# Patient Record
Sex: FEMALE | Race: WHITE | NOT HISPANIC OR LATINO | Employment: UNEMPLOYED | URBAN - NONMETROPOLITAN AREA
[De-identification: names, ages, dates, MRNs, and addresses within clinical notes are randomized per-mention and may not be internally consistent; named-entity substitution may affect disease eponyms.]

---

## 2024-11-08 ENCOUNTER — APPOINTMENT (OUTPATIENT)
Dept: PRIMARY CARE | Facility: CLINIC | Age: 64
End: 2024-11-08
Payer: COMMERCIAL

## 2024-12-04 ASSESSMENT — PROMIS GLOBAL HEALTH SCALE
RATE_MENTAL_HEALTH: VERY GOOD
RATE_PHYSICAL_HEALTH: VERY GOOD
CARRYOUT_SOCIAL_ACTIVITIES: VERY GOOD
RATE_GENERAL_HEALTH: VERY GOOD
RATE_AVERAGE_PAIN: 0
CARRYOUT_PHYSICAL_ACTIVITIES: COMPLETELY
RATE_SOCIAL_SATISFACTION: VERY GOOD
RATE_QUALITY_OF_LIFE: VERY GOOD
EMOTIONAL_PROBLEMS: RARELY

## 2024-12-05 PROBLEM — I49.9 IRREGULAR HEART BEAT: Status: ACTIVE | Noted: 2022-09-19

## 2024-12-05 PROBLEM — N80.9 ENDOMETRIOSIS: Status: ACTIVE | Noted: 2022-09-14

## 2024-12-05 PROBLEM — I38: Status: ACTIVE | Noted: 2022-09-14

## 2024-12-11 ENCOUNTER — APPOINTMENT (OUTPATIENT)
Dept: PRIMARY CARE | Facility: CLINIC | Age: 64
End: 2024-12-11
Payer: COMMERCIAL

## 2024-12-11 VITALS
RESPIRATION RATE: 18 BRPM | HEIGHT: 68 IN | SYSTOLIC BLOOD PRESSURE: 132 MMHG | OXYGEN SATURATION: 96 % | WEIGHT: 211.8 LBS | HEART RATE: 72 BPM | BODY MASS INDEX: 32.1 KG/M2 | DIASTOLIC BLOOD PRESSURE: 85 MMHG

## 2024-12-11 DIAGNOSIS — Z87.891 FORMER CIGARETTE SMOKER: ICD-10-CM

## 2024-12-11 DIAGNOSIS — E55.9 VITAMIN D DEFICIENCY: ICD-10-CM

## 2024-12-11 DIAGNOSIS — Z13.220 LIPID SCREENING: ICD-10-CM

## 2024-12-11 DIAGNOSIS — I38: ICD-10-CM

## 2024-12-11 DIAGNOSIS — E06.3 HASHIMOTO'S THYROIDITIS: ICD-10-CM

## 2024-12-11 PROCEDURE — 99386 PREV VISIT NEW AGE 40-64: CPT | Performed by: INTERNAL MEDICINE

## 2024-12-11 PROCEDURE — 1036F TOBACCO NON-USER: CPT | Performed by: INTERNAL MEDICINE

## 2024-12-11 PROCEDURE — 3008F BODY MASS INDEX DOCD: CPT | Performed by: INTERNAL MEDICINE

## 2024-12-11 RX ORDER — IBUPROFEN 100 MG/5ML
1000 SUSPENSION, ORAL (FINAL DOSE FORM) ORAL DAILY
COMMUNITY
Start: 2015-01-01

## 2024-12-11 RX ORDER — AA/PROT/LYSINE/METHIO/VIT C/B6 50-12.5 MG
TABLET ORAL DAILY
COMMUNITY
Start: 2015-01-01

## 2024-12-11 RX ORDER — CHOLECALCIFEROL (VITAMIN D3) 50 MCG
TABLET ORAL
COMMUNITY

## 2024-12-11 RX ORDER — LANOLIN ALCOHOL/MO/W.PET/CERES
1000 CREAM (GRAM) TOPICAL DAILY
COMMUNITY

## 2024-12-11 RX ORDER — MULTIVITAMIN
TABLET ORAL
COMMUNITY

## 2024-12-11 ASSESSMENT — ENCOUNTER SYMPTOMS
CARDIOVASCULAR NEGATIVE: 1
CONSTITUTIONAL NEGATIVE: 1
GASTROINTESTINAL NEGATIVE: 1
NEUROLOGICAL NEGATIVE: 1
MUSCULOSKELETAL NEGATIVE: 1
PSYCHIATRIC NEGATIVE: 1
RESPIRATORY NEGATIVE: 1

## 2024-12-11 ASSESSMENT — PATIENT HEALTH QUESTIONNAIRE - PHQ9
2. FEELING DOWN, DEPRESSED OR HOPELESS: NOT AT ALL
SUM OF ALL RESPONSES TO PHQ9 QUESTIONS 1 AND 2: 0
1. LITTLE INTEREST OR PLEASURE IN DOING THINGS: NOT AT ALL

## 2024-12-11 ASSESSMENT — PAIN SCALES - GENERAL: PAINLEVEL_OUTOF10: 0-NO PAIN

## 2024-12-11 NOTE — PATIENT INSTRUCTIONS
It was great to see you in the office today! Here is what we discussed at your visit today:    Please get bloodwork drawn as soon as you are able. We will call you with results.      Schedule CT scan of your chest for screening due to history of smoking, we will call you with results    Follow up in one year or sooner if needed

## 2024-12-11 NOTE — PROGRESS NOTES
Subjective   Patient ID:   Anjali Whitley is a 64 y.o. female who presents for Establish Care and Annual Exam.  HPI  Pain scale: 0 (no pain)  Living will? Yes  POA? Yes:  Reji   Are you currently or have you recently been threatened or abused? No  Do you feel unsafe going back to the place you are living? No  Reported health: Excellent  Dental visits? Yes  Hearing problems? No  Vision problems? Yes - wears glasses  Healthy diet? Yes  Exercise? Exercise 1-3x per week  Adequate fluid intake? Yes    Social History     Tobacco Use    Smoking status: Former     Current packs/day: 0.50     Average packs/day: 0.5 packs/day for 15.0 years (7.5 ttl pk-yrs)     Types: Cigarettes    Smokeless tobacco: Never   Substance Use Topics    Alcohol use: Not Currently    Drug use: Never       Immunization History   Administered Date(s) Administered    Flu vaccine, quadrivalent, no egg protein, age 6 month or greater (FLUCELVAX) 08/31/2023    Flu vaccine, trivalent, preservative free, no egg protein, age 6 months or greater (Flucelvax) 09/04/2024    Influenza, Unspecified 08/18/2024    [employer] SARS-CoV-2 Vaccination 09/09/2021       Review of Systems  12 point review of systems negative unless stated above in HPI    There were no vitals filed for this visit.    Physical Exam  General: Alert and oriented, well nourished, no acute distress.  Lungs: Clear to auscultation, non-labored respiration.  Heart: Normal rate, regular rhythm, no murmur, gallop or edema.  Neurologic: Awake, alert, and oriented X3, CN II-XII intact.  Psychiatric: Cooperative, appropriate mood and affect.    Assessment/Plan

## 2024-12-11 NOTE — PROGRESS NOTES
Patient ID: She states she is here for annual physical. She states she needs thyroid panels. She states she had an ultrasound two years ago of her thyroid, has goiter and nodules, has had biopsies and is now having US every 2-3 years at [facility]. She states she has no other issues. Denies CP, cough or SOB. Denies any issues with her bowels. No issues with urination. Denies BLE edema. She denies depression. She states she walks on treadmill, uses stationary bike and yoga.     HEALTH MAINTENANCE: Establish Care  Previous PCP: Dr. Jennifer Estes  Last Office Visit: 10/23/23  Mammogram (40-75): 11/9/24: Finding 1: Focal asymmetry in the right breast requires additional evaluation. Diagnostic mammogram is recommended.  Finding 2:  Biopsy markers in the left breast are benign, orders placed for diagnostic mammogram and US right breast, unsure if completed(she states that she provided results of all of her mammograms since 2015 and the area of concern is always the same and she does not want to do further imaging at this time)  Pap smear (21-65, or hysterectomy q5yrs): 9/18/24 abnormal, revealed Atypical squamous cells of undetermined significance , HPV negative  Last Labs: 10/23/23 total cholesterol and LDL elevated  CT lung screening:   Colonoscopy (45-75 or age 40 with 1st degree relative dx colon ca): 6/29/22 Internal hemorrhoids, Diverticulosis in the sigmoid colon and in the descending colon, One 2 mm polyp in the descending colon(hyperplastic polyp)    - followed by [facility] for thyroid nodules, Hashimotos, states most recent US was 2 years ago    -seen by cardiology on 9/19/22 for chest pain, had work up due to history of smoking and familial history CAD  4/27/2022 SE Treadmill  STRESS ECHO   Peak  bpm. (87 % MPHR)   Peak  mmHg/86 mmHg.   The left ventricular cavity size is decreased with stress.       CONCLUSIONS:   - The exercise stress echo was negative for ischemia at 87 % of MPHR (7.0 METS).   - The left  ventricle is normal in size. Left ventricular systolic function is   normal. EF = 58 ± 5% (2D biplane)   - The right ventricle is normal in size. Right ventricular systolic function is   normal.   - The visualized aorta is borderline dilated with a maximal dimension of 3.9 cm.   - Normal post stress echo images but significant ST depression at peak stress.   Normal functional capacity and no symptoms. Suspect false positive ECG portion in   the setting of resting ST abnormality but clinical correlation recommeded.   - The patient has not had a prior CC echocardiographic exam for comparison.                        HPI Anjali Whitley is a 64 y.o. female with PMH remarkable for Hashimoto's thyroiditis, family history of CAD who presents to the office today for Establish Care and Annual Exam.    Social History     Tobacco Use    Smoking status: Former     Current packs/day: 0.50     Average packs/day: 0.5 packs/day for 15.0 years (7.5 ttl pk-yrs)     Types: Cigarettes    Smokeless tobacco: Never   Substance Use Topics    Alcohol use: Not Currently    Drug use: Never     Immunization History   Administered Date(s) Administered    Flu vaccine, quadrivalent, no egg protein, age 6 month or greater (FLUCELVAX) 08/31/2023    Flu vaccine, trivalent, preservative free, no egg protein, age 6 months or greater (Flucelvax) 09/04/2024    Influenza, Unspecified 08/18/2024    [employer] SARS-CoV-2 Vaccination 09/09/2021     Review of Systems   Constitutional: Negative.    HENT: Negative.     Respiratory: Negative.     Cardiovascular: Negative.    Gastrointestinal: Negative.    Genitourinary: Negative.    Musculoskeletal: Negative.    Skin: Negative.    Neurological: Negative.    Psychiatric/Behavioral: Negative.         Allergies   Allergen Reactions    Iodinated Contrast Media Other     Pt has burning in her veins, heart racing and rash    Other reaction(s): Other: See Comments   Pt has burning in her veins, heart racing and rash     "Venom-Wasp Anaphylaxis, Anxiety, Dermatitis, Hives, Itching and Swelling    Poison Ivy Extract Dermatitis, Hives, Itching, Rash and Swelling     Pt states rash breaks out all over, systemic reaction    Sulfa (Sulfonamide Antibiotics) Hives, Itching, Rash and Swelling     Rash and hives      Visit Vitals  /85   Pulse 72   Resp 18   Ht 1.727 m (5' 8\")   Wt 96.1 kg (211 lb 12.8 oz)   SpO2 96%   BMI 32.20 kg/m²   Smoking Status Former   BSA 2.15 m²     Physical Exam  Vitals reviewed.   Constitutional:       Appearance: Normal appearance.   HENT:      Head: Normocephalic and atraumatic.   Neck:      Thyroid: Thyromegaly present.      Comments: Goiter, larger on left side  Cardiovascular:      Rate and Rhythm: Normal rate and regular rhythm.      Pulses: Normal pulses.      Heart sounds: Murmur heard.   Pulmonary:      Effort: Pulmonary effort is normal.      Breath sounds: Normal breath sounds.   Abdominal:      General: Bowel sounds are normal.      Palpations: Abdomen is soft.   Musculoskeletal:         General: Normal range of motion.   Skin:     General: Skin is warm and dry.   Neurological:      General: No focal deficit present.      Mental Status: She is alert and oriented to person, place, and time.   Psychiatric:         Mood and Affect: Mood normal.         Behavior: Behavior normal.         Current Outpatient Medications   Medication Instructions    ascorbic acid (VITAMIN C) 1,000 mg, Daily    cholecalciferol (Vitamin D-3) 50 MCG (2000 UT) tablet Take by mouth.    coenzyme Q-10 (Co Q-10) 10 mg capsule Daily    cyanocobalamin (VITAMIN B-12) 1,000 mcg, Daily    FOLIC ACID ORAL     multivitamin tablet Take by mouth.     Lab Results   Component Value Date    HGBA1C 5.4 04/13/2022       Problem List Items Addressed This Visit       Regurgitation of non-mitral, non-tricuspid atrioventricular valve    Relevant Orders    CT lung screening low dose    Comprehensive metabolic panel    Hashimoto's thyroiditis    " Relevant Orders    TSH    T4, free    Vitamin B12    CBC and Auto Differential    T3, free    Thyroid Stimulating Immunoglobulin    Vitamin D deficiency    Relevant Orders    Vitamin D 25-Hydroxy,Total (for eval of Vitamin D levels)    Former cigarette smoker    Relevant Orders    CT lung screening low dose    Lipid screening    Relevant Orders    Lipid panel        --------------------  Written by Stephie Clinton RN, acting as a scribe for Dr. Celaya. This note accurately reflects the work and decisions made by Dr. Celaya.     I, Dr. Celaya, attest all medical record entries made by the scribe were under my direction and were personally dictated by me. I have reviewed the chart and agree that the record accurately reflects my performance of the history, physical exam, and assessment and plan.

## 2025-01-13 ENCOUNTER — APPOINTMENT (OUTPATIENT)
Dept: RADIOLOGY | Facility: HOSPITAL | Age: 65
End: 2025-01-13
Payer: COMMERCIAL

## 2025-02-12 LAB
25(OH)D3+25(OH)D2 SERPL-MCNC: 37 NG/ML (ref 30–100)
ALBUMIN SERPL-MCNC: 4.2 G/DL (ref 3.6–5.1)
ALP SERPL-CCNC: 98 U/L (ref 37–153)
ALT SERPL-CCNC: 15 U/L (ref 6–29)
ANION GAP SERPL CALCULATED.4IONS-SCNC: 6 MMOL/L (CALC) (ref 7–17)
AST SERPL-CCNC: 15 U/L (ref 10–35)
BASOPHILS # BLD AUTO: 43 CELLS/UL (ref 0–200)
BASOPHILS NFR BLD AUTO: 0.7 %
BILIRUB SERPL-MCNC: 0.6 MG/DL (ref 0.2–1.2)
BUN SERPL-MCNC: 16 MG/DL (ref 7–25)
CALCIUM SERPL-MCNC: 8.9 MG/DL (ref 8.6–10.4)
CHLORIDE SERPL-SCNC: 108 MMOL/L (ref 98–110)
CHOLEST SERPL-MCNC: 195 MG/DL
CHOLEST/HDLC SERPL: 4.1 (CALC)
CO2 SERPL-SCNC: 26 MMOL/L (ref 20–32)
CREAT SERPL-MCNC: 0.76 MG/DL (ref 0.5–1.05)
EGFRCR SERPLBLD CKD-EPI 2021: 88 ML/MIN/1.73M2
EOSINOPHIL # BLD AUTO: 161 CELLS/UL (ref 15–500)
EOSINOPHIL NFR BLD AUTO: 2.6 %
ERYTHROCYTE [DISTWIDTH] IN BLOOD BY AUTOMATED COUNT: 12 % (ref 11–15)
GLUCOSE SERPL-MCNC: 97 MG/DL (ref 65–99)
HCT VFR BLD AUTO: 39.2 % (ref 35–45)
HDLC SERPL-MCNC: 47 MG/DL
HGB BLD-MCNC: 12.7 G/DL (ref 11.7–15.5)
LDLC SERPL CALC-MCNC: 128 MG/DL (CALC)
LYMPHOCYTES # BLD AUTO: 1947 CELLS/UL (ref 850–3900)
LYMPHOCYTES NFR BLD AUTO: 31.4 %
MCH RBC QN AUTO: 29.7 PG (ref 27–33)
MCHC RBC AUTO-ENTMCNC: 32.4 G/DL (ref 32–36)
MCV RBC AUTO: 91.8 FL (ref 80–100)
MONOCYTES # BLD AUTO: 341 CELLS/UL (ref 200–950)
MONOCYTES NFR BLD AUTO: 5.5 %
NEUTROPHILS # BLD AUTO: 3708 CELLS/UL (ref 1500–7800)
NEUTROPHILS NFR BLD AUTO: 59.8 %
NONHDLC SERPL-MCNC: 148 MG/DL (CALC)
PLATELET # BLD AUTO: 320 THOUSAND/UL (ref 140–400)
PMV BLD REES-ECKER: 9.7 FL (ref 7.5–12.5)
POTASSIUM SERPL-SCNC: 4.1 MMOL/L (ref 3.5–5.3)
PROT SERPL-MCNC: 7 G/DL (ref 6.1–8.1)
RBC # BLD AUTO: 4.27 MILLION/UL (ref 3.8–5.1)
SODIUM SERPL-SCNC: 140 MMOL/L (ref 135–146)
T3FREE SERPL-MCNC: 3.4 PG/ML (ref 2.3–4.2)
T4 FREE SERPL-MCNC: 1.1 NG/DL (ref 0.8–1.8)
TRIGL SERPL-MCNC: 94 MG/DL
TSH SERPL-ACNC: 3.39 MIU/L (ref 0.4–4.5)
TSI SER-ACNC: NORMAL
VIT B12 SERPL-MCNC: 558 PG/ML (ref 200–1100)
WBC # BLD AUTO: 6.2 THOUSAND/UL (ref 3.8–10.8)

## 2025-02-17 LAB
25(OH)D3+25(OH)D2 SERPL-MCNC: 37 NG/ML (ref 30–100)
ALBUMIN SERPL-MCNC: 4.2 G/DL (ref 3.6–5.1)
ALP SERPL-CCNC: 98 U/L (ref 37–153)
ALT SERPL-CCNC: 15 U/L (ref 6–29)
ANION GAP SERPL CALCULATED.4IONS-SCNC: 6 MMOL/L (CALC) (ref 7–17)
AST SERPL-CCNC: 15 U/L (ref 10–35)
BASOPHILS # BLD AUTO: 43 CELLS/UL (ref 0–200)
BASOPHILS NFR BLD AUTO: 0.7 %
BILIRUB SERPL-MCNC: 0.6 MG/DL (ref 0.2–1.2)
BUN SERPL-MCNC: 16 MG/DL (ref 7–25)
CALCIUM SERPL-MCNC: 8.9 MG/DL (ref 8.6–10.4)
CHLORIDE SERPL-SCNC: 108 MMOL/L (ref 98–110)
CHOLEST SERPL-MCNC: 195 MG/DL
CHOLEST/HDLC SERPL: 4.1 (CALC)
CO2 SERPL-SCNC: 26 MMOL/L (ref 20–32)
CREAT SERPL-MCNC: 0.76 MG/DL (ref 0.5–1.05)
EGFRCR SERPLBLD CKD-EPI 2021: 88 ML/MIN/1.73M2
EOSINOPHIL # BLD AUTO: 161 CELLS/UL (ref 15–500)
EOSINOPHIL NFR BLD AUTO: 2.6 %
ERYTHROCYTE [DISTWIDTH] IN BLOOD BY AUTOMATED COUNT: 12 % (ref 11–15)
GLUCOSE SERPL-MCNC: 97 MG/DL (ref 65–99)
HCT VFR BLD AUTO: 39.2 % (ref 35–45)
HDLC SERPL-MCNC: 47 MG/DL
HGB BLD-MCNC: 12.7 G/DL (ref 11.7–15.5)
LDLC SERPL CALC-MCNC: 128 MG/DL (CALC)
LYMPHOCYTES # BLD AUTO: 1947 CELLS/UL (ref 850–3900)
LYMPHOCYTES NFR BLD AUTO: 31.4 %
MCH RBC QN AUTO: 29.7 PG (ref 27–33)
MCHC RBC AUTO-ENTMCNC: 32.4 G/DL (ref 32–36)
MCV RBC AUTO: 91.8 FL (ref 80–100)
MONOCYTES # BLD AUTO: 341 CELLS/UL (ref 200–950)
MONOCYTES NFR BLD AUTO: 5.5 %
NEUTROPHILS # BLD AUTO: 3708 CELLS/UL (ref 1500–7800)
NEUTROPHILS NFR BLD AUTO: 59.8 %
NONHDLC SERPL-MCNC: 148 MG/DL (CALC)
PLATELET # BLD AUTO: 320 THOUSAND/UL (ref 140–400)
PMV BLD REES-ECKER: 9.7 FL (ref 7.5–12.5)
POTASSIUM SERPL-SCNC: 4.1 MMOL/L (ref 3.5–5.3)
PROT SERPL-MCNC: 7 G/DL (ref 6.1–8.1)
RBC # BLD AUTO: 4.27 MILLION/UL (ref 3.8–5.1)
SODIUM SERPL-SCNC: 140 MMOL/L (ref 135–146)
T3FREE SERPL-MCNC: 3.4 PG/ML (ref 2.3–4.2)
T4 FREE SERPL-MCNC: 1.1 NG/DL (ref 0.8–1.8)
TRIGL SERPL-MCNC: 94 MG/DL
TSH SERPL-ACNC: 3.39 MIU/L (ref 0.4–4.5)
TSI SER-ACNC: <89 % BASELINE
VIT B12 SERPL-MCNC: 558 PG/ML (ref 200–1100)
WBC # BLD AUTO: 6.2 THOUSAND/UL (ref 3.8–10.8)